# Patient Record
Sex: FEMALE | Race: WHITE | NOT HISPANIC OR LATINO | Employment: OTHER | URBAN - METROPOLITAN AREA
[De-identification: names, ages, dates, MRNs, and addresses within clinical notes are randomized per-mention and may not be internally consistent; named-entity substitution may affect disease eponyms.]

---

## 2018-10-26 ENCOUNTER — OFFICE VISIT (OUTPATIENT)
Dept: URGENT CARE | Facility: CLINIC | Age: 43
End: 2018-10-26
Payer: COMMERCIAL

## 2018-10-26 VITALS
HEIGHT: 60 IN | WEIGHT: 91 LBS | DIASTOLIC BLOOD PRESSURE: 65 MMHG | OXYGEN SATURATION: 98 % | SYSTOLIC BLOOD PRESSURE: 111 MMHG | HEART RATE: 91 BPM | TEMPERATURE: 99 F | BODY MASS INDEX: 17.87 KG/M2

## 2018-10-26 DIAGNOSIS — H10.31 ACUTE BACTERIAL CONJUNCTIVITIS OF RIGHT EYE: Primary | ICD-10-CM

## 2018-10-26 PROCEDURE — 99203 OFFICE O/P NEW LOW 30 MIN: CPT | Mod: S$GLB,,, | Performed by: PHYSICIAN ASSISTANT

## 2018-10-26 PROCEDURE — 3008F BODY MASS INDEX DOCD: CPT | Mod: CPTII,S$GLB,, | Performed by: PHYSICIAN ASSISTANT

## 2018-10-26 RX ORDER — RIZATRIPTAN BENZOATE 10 MG/1
10 TABLET ORAL
COMMUNITY
End: 2020-08-19 | Stop reason: SDUPTHER

## 2018-10-26 RX ORDER — NEOMYCIN SULFATE, POLYMYXIN B SULFATE AND DEXAMETHASONE 3.5; 10000; 1 MG/ML; [USP'U]/ML; MG/ML
2 SUSPENSION/ DROPS OPHTHALMIC EVERY 6 HOURS
Qty: 5 ML | Refills: 0 | Status: SHIPPED | OUTPATIENT
Start: 2018-10-26 | End: 2018-11-02

## 2018-10-26 NOTE — PROGRESS NOTES
"Subjective:       Patient ID: Katherine Slater is a 43 y.o. female.    Vitals:  height is 5' 0.04" (1.525 m) and weight is 41.3 kg (91 lb). Her oral temperature is 98.5 °F (36.9 °C). Her blood pressure is 111/65 and her pulse is 91. Her oxygen saturation is 98%.     Chief Complaint: Stye    xLast night right eye pain woke pt up from sleep, pt states eye was completely crusted over, pt performed warm compresses with a warm wet rag, states some relief. Pt concerned for stye. Pt states pain is radiating under right eye and is tender to touch. Pt states no left eye pain.       Eye Problem    The right eye is affected. This is a new problem. The current episode started yesterday. The problem occurs constantly. The problem has been gradually worsening. There was no injury mechanism. The pain is at a severity of 6/10. The pain is moderate. There is no known exposure to pink eye. She wears contacts. Associated symptoms include an eye discharge and eye redness. Pertinent negatives include no blurred vision, fever, nausea, photophobia or vomiting. She has tried water for the symptoms. The treatment provided mild relief.     Review of Systems   Constitution: Negative for chills and fever.   HENT: Negative for congestion.    Eyes: Positive for discharge, pain and redness. Negative for blurred vision and photophobia.   Gastrointestinal: Negative for nausea and vomiting.   Neurological: Negative for headaches.       Objective:      Physical Exam   Constitutional: She is oriented to person, place, and time. She appears well-developed and well-nourished.   HENT:   Head: Normocephalic and atraumatic.   Right Ear: External ear normal.   Left Ear: External ear normal.   Nose: Nose normal.   Mouth/Throat: Oropharynx is clear and moist.   Eyes: EOM and lids are normal. Pupils are equal, round, and reactive to light. Right eye exhibits discharge. Right conjunctiva is injected.   Swollen right lower lid   Neck: Trachea normal, full " passive range of motion without pain and phonation normal. Neck supple.   Musculoskeletal: Normal range of motion.   Neurological: She is alert and oriented to person, place, and time.   Skin: Skin is warm, dry and intact.   Psychiatric: She has a normal mood and affect. Her speech is normal and behavior is normal. Judgment and thought content normal. Cognition and memory are normal.   Nursing note and vitals reviewed.      Assessment:       1. Acute bacterial conjunctivitis of right eye        Plan:         Acute bacterial conjunctivitis of right eye    Other orders  -     neomycin-polymyxin-dexamethasone (MAXITROL) 3.5mg/mL-10,000 unit/mL-0.1 % DrpS; Place 2 drops into the right eye every 6 (six) hours. for 7 days  Dispense: 5 mL; Refill: 0     I discussed with the patient the importance of follow up if their symptoms have not resolved in 1-2 week's time. Patient verbalized understanding and will RTC or go to the nearest ER if symptoms persist or worsen.

## 2018-10-29 ENCOUNTER — TELEPHONE (OUTPATIENT)
Dept: URGENT CARE | Facility: CLINIC | Age: 43
End: 2018-10-29

## 2018-12-19 ENCOUNTER — PES CALL (OUTPATIENT)
Dept: ADMINISTRATIVE | Facility: CLINIC | Age: 43
End: 2018-12-19

## 2020-08-03 ENCOUNTER — PATIENT MESSAGE (OUTPATIENT)
Dept: FAMILY MEDICINE | Facility: CLINIC | Age: 45
End: 2020-08-03

## 2020-08-03 ENCOUNTER — OFFICE VISIT (OUTPATIENT)
Dept: FAMILY MEDICINE | Facility: CLINIC | Age: 45
End: 2020-08-03
Payer: COMMERCIAL

## 2020-08-03 VITALS — RESPIRATION RATE: 16 BRPM | WEIGHT: 90 LBS | BODY MASS INDEX: 17.67 KG/M2 | HEIGHT: 60 IN

## 2020-08-03 DIAGNOSIS — Z00.00 PREVENTATIVE HEALTH CARE: Primary | ICD-10-CM

## 2020-08-03 DIAGNOSIS — Z80.8 FAMILY HISTORY OF MELANOMA: ICD-10-CM

## 2020-08-03 DIAGNOSIS — Z80.3 FAMILY HISTORY OF BREAST CANCER: Primary | ICD-10-CM

## 2020-08-03 DIAGNOSIS — Z80.0 FAMILY HISTORY OF COLON CANCER: ICD-10-CM

## 2020-08-03 DIAGNOSIS — Z80.41 FAMILY HISTORY OF OVARIAN CANCER: ICD-10-CM

## 2020-08-03 DIAGNOSIS — Z80.42 FAMILY HISTORY OF PROSTATE CANCER IN FATHER: ICD-10-CM

## 2020-08-03 DIAGNOSIS — Z80.3 FAMILY HISTORY OF BREAST CANCER: ICD-10-CM

## 2020-08-03 PROCEDURE — 99203 OFFICE O/P NEW LOW 30 MIN: CPT | Mod: 95,,, | Performed by: INTERNAL MEDICINE

## 2020-08-03 PROCEDURE — 3008F BODY MASS INDEX DOCD: CPT | Mod: CPTII,,, | Performed by: INTERNAL MEDICINE

## 2020-08-03 PROCEDURE — 3008F PR BODY MASS INDEX (BMI) DOCUMENTED: ICD-10-PCS | Mod: CPTII,,, | Performed by: INTERNAL MEDICINE

## 2020-08-03 PROCEDURE — 99203 PR OFFICE/OUTPT VISIT, NEW, LEVL III, 30-44 MIN: ICD-10-PCS | Mod: 95,,, | Performed by: INTERNAL MEDICINE

## 2020-08-03 NOTE — TELEPHONE ENCOUNTER
Please review and advise if you would like to order this, or if there is any information we need to get about fam hx prior to ordering this.

## 2020-08-03 NOTE — PROGRESS NOTES
Assessment and Plan:    1. Preventative health care  - Comprehensive metabolic panel; Future  - CBC auto differential; Future  - Lipid Panel; Future  - TSH; Future  - Vitamin D; Future  - Hemoglobin A1C; Future  - HIV 1/2 Ag/Ab (4th Gen); Future  - Hepatitis C Antibody; Future  - Mammo Digital Screening Bilat; Future    2. Family history of breast cancer  - Ambulatory referral/consult to Breast Surgery; Future    3. Family history of melanoma  - Ambulatory referral/consult to Breast Surgery; Future    4. Family history of ovarian cancer  - Ambulatory referral/consult to Breast Surgery; Future    5. Family history of colon cancer    6. Family history of prostate cancer in father    Discussed family history in detail today. Strong family history of breast cancer and melanoma in multiple family members on both sides of family, also with individuals who had ovarian, colon, and prostate cancers. Recommend discussion with breast surgery regarding possible testing for BRCA genes or other genes associated with these cancers. Discussed that we should start with mammogram at minimum, but pending results of additional testing may recommend breast MRI.    ______________________________________________________________________  Subjective:    Chief Complaint:  Establish care    HPI:  Katherine is a 44 y.o. year old woman with virtual visit today to establish care and discuss concerns related to family history.    The patient location is: Home in [place]  The chief complaint leading to consultation is: as above    Visit type: audiovisual    Face to Face time with patient: 20 minutes  25 minutes of total time spent on the encounter, which includes face to face time and non-face to face time preparing to see the patient (eg, review of tests), Obtaining and/or reviewing separately obtained history, Documenting clinical information in the electronic or other health record, Independently interpreting results (not separately reported) and  communicating results to the patient/family/caregiver, or Care coordination (not separately reported).     Each patient to whom he or she provides medical services by telemedicine is:  (1) informed of the relationship between the physician and patient and the respective role of any other health care provider with respect to management of the patient; and (2) notified that he or she may decline to receive medical services by telemedicine and may withdraw from such care at any time.    Notes:     She personally had what she was told was an early melanoma removed from her thigh. Had large local resection, but no other treatment. She sees Dr. Segura regularly, and occasionally still sees the doctor who had treated her in [place].     Both of her father's parents had breast cancer and ovarian cancer in multiple family members including 2 family members with breast cancer in 20s and 30s. Also has multiple family members on her father's side with melanoma. Her mother's sister also had breast cancer, unknown age.    She personally has a history of very mild intermittent asthma. More exercise induced than anything else.    She had been on depo for birth control for many years and had also been on nuvaring for several years, but has been off of hormonal contraception since her  had a vasectomy years ago.       Past Medical History:  Past Medical History:   Diagnosis Date    Allergy     Angio-edema     Asthma     Melanoma in situ     Thigh, early 2000s, localized and surgically resected    Urticaria        Past Surgical History:  Past Surgical History:   Procedure Laterality Date    APPENDECTOMY      EYE SURGERY      SKIN CANCER EXCISION      left leg       Family History:  Family History   Problem Relation Age of Onset    Allergic rhinitis Mother     Allergies Mother     Allergic rhinitis Father     Allergies Father     Prostate cancer Father     Allergies Brother     Allergic rhinitis Brother      Breast cancer Paternal Grandmother     Liver cancer Paternal Grandmother     Colon cancer Paternal Aunt     Ovarian cancer Paternal Aunt     Breast cancer Other         paternal great aunt    Cervical cancer Other         paternal great grandmother (PGF mother)    Melanoma Other         paternal great uncle    Melanoma Other         cousin    Breast cancer Maternal Aunt         unknown age    Angioedema Neg Hx     Asthma Neg Hx     Eczema Neg Hx     Immunodeficiency Neg Hx     Atopy Neg Hx     Rhinitis Neg Hx     Urticaria Neg Hx        Social History:  Social History     Socioeconomic History    Marital status:      Spouse name: Not on file    Number of children: Not on file    Years of education: Not on file    Highest education level: Not on file   Occupational History    Not on file   Social Needs    Financial resource strain: Not on file    Food insecurity     Worry: Not on file     Inability: Not on file    Transportation needs     Medical: Not on file     Non-medical: Not on file   Tobacco Use    Smoking status: Never Smoker    Smokeless tobacco: Never Used   Substance and Sexual Activity    Alcohol use: No    Drug use: Never    Sexual activity: Yes     Partners: Male     Birth control/protection: Partner-Vasectomy   Lifestyle    Physical activity     Days per week: Not on file     Minutes per session: Not on file    Stress: Not on file   Relationships    Social connections     Talks on phone: Not on file     Gets together: Not on file     Attends Anabaptism service: Not on file     Active member of club or organization: Not on file     Attends meetings of clubs or organizations: Not on file     Relationship status: Not on file   Other Topics Concern    Not on file   Social History Narrative    Owner of paramedical company.       Medications:  Current Outpatient Medications on File Prior to Visit   Medication Sig Dispense Refill    ACETAMINOPHEN/PAMABROM (MIDOL ORAL)  Take 1 tablet by mouth as needed.      aspirin-acetaminophen-caffeine 250-250-65 mg (EXCEDRIN MIGRAINE) 250-250-65 mg per tablet Take 2 tablets by mouth every 6 (six) hours as needed.      epinephrine (EPIPEN 2-ARNOLDO) 0.3 mg/0.3 mL (1:1,000) PnIj Inject 0.3 mLs (0.3 mg total) into the muscle once. 2 Device 3    rizatriptan (MAXALT) 10 MG tablet Take 10 mg by mouth as needed for Migraine.       No current facility-administered medications on file prior to visit.        Allergies:  Fish oil and Shellfish containing products    Immunizations:  Immunization History   Administered Date(s) Administered    Influenza Split 11/26/2013       Review of Systems:  Review of Systems   Constitutional: Negative for activity change and unexpected weight change.   HENT: Negative for hearing loss, rhinorrhea and trouble swallowing.    Eyes: Negative for discharge and visual disturbance.   Respiratory: Negative for chest tightness and wheezing.    Cardiovascular: Negative for chest pain and palpitations.   Gastrointestinal: Negative for blood in stool, constipation, diarrhea and vomiting.   Endocrine: Negative for polydipsia and polyuria.   Genitourinary: Negative for difficulty urinating, dysuria, hematuria and menstrual problem.   Musculoskeletal: Negative for arthralgias, joint swelling and neck pain.   Skin: Negative for rash and wound.   Neurological: Negative for weakness and headaches.   Psychiatric/Behavioral: Negative for confusion and dysphoric mood.     Entered by patient and reviewed and updated during visit      Objective:    Vitals:  Vitals:    08/03/20 1008   Resp: 16   Weight: 40.8 kg (90 lb)   Height: 5' (1.524 m)       Physical Exam  Constitutional:       General: She is not in acute distress.     Appearance: She is well-developed.   HENT:      Head: Normocephalic and atraumatic.   Pulmonary:      Effort: Pulmonary effort is normal. No respiratory distress.   Neurological:      Mental Status: She is alert and  oriented to person, place, and time.   Psychiatric:         Behavior: Behavior normal.         Thought Content: Thought content normal.         Judgment: Judgment normal.         Body mass index is 17.58 kg/m².      Data:  Previous labs reviewed and pertinent for hypokalemia in 2014.        Maria Victoria Vicente MD  Internal Medicine

## 2020-08-03 NOTE — Clinical Note
Please schedule apt with Dr. Moise (breast surgery), schedule labs and mammogram, and schedule apt with me in person after labs and mammogram for annual with Pap

## 2020-08-04 ENCOUNTER — HOSPITAL ENCOUNTER (OUTPATIENT)
Dept: RADIOLOGY | Facility: HOSPITAL | Age: 45
Discharge: HOME | End: 2020-08-04
Attending: INTERNAL MEDICINE
Payer: COMMERCIAL

## 2020-08-04 DIAGNOSIS — Z12.31 BREAST CANCER SCREENING BY MAMMOGRAM: ICD-10-CM

## 2020-08-04 PROCEDURE — 77067 SCR MAMMO BI INCL CAD: CPT | Mod: TC,PO

## 2020-08-04 PROCEDURE — 77067 SCR MAMMO BI INCL CAD: CPT | Mod: 26,,, | Performed by: RADIOLOGY

## 2020-08-04 PROCEDURE — 77063 BREAST TOMOSYNTHESIS BI: CPT | Mod: 26,,, | Performed by: RADIOLOGY

## 2020-08-04 PROCEDURE — 77067 MAMMO DIGITAL SCREENING BILAT WITH TOMOSYNTHESIS_CAD: ICD-10-PCS | Mod: 26,,, | Performed by: RADIOLOGY

## 2020-08-04 PROCEDURE — 77063 MAMMO DIGITAL SCREENING BILAT WITH TOMOSYNTHESIS_CAD: ICD-10-PCS | Mod: 26,,, | Performed by: RADIOLOGY

## 2020-08-05 ENCOUNTER — PATIENT OUTREACH (OUTPATIENT)
Dept: ADMINISTRATIVE | Facility: HOSPITAL | Age: 45
End: 2020-08-05

## 2020-08-05 NOTE — PROGRESS NOTES
Health Maintenance Due   Topic Date Due    Hepatitis C Screening  1973    HIV Screening  1988    TETANUS VACCINE  1991    Lipid Panel  11/15/2018    Pap Smear with HPV Cotest  2018       Chart review completed 2020.  Care Everywhere updates requested and reviewed.  Immunizations reconciled. Media reports reviewed.  Duplicate HM overrides and  orders removed.  Overdue HM topic chart audit and/or requested.  Overdue lab testing linked to upcoming lab appointments if applies.

## 2020-08-06 ENCOUNTER — TELEPHONE (OUTPATIENT)
Dept: RADIOLOGY | Facility: HOSPITAL | Age: 45
End: 2020-08-06

## 2020-08-12 ENCOUNTER — TELEPHONE (OUTPATIENT)
Dept: FAMILY MEDICINE | Facility: CLINIC | Age: 45
End: 2020-08-12

## 2020-08-12 ENCOUNTER — CLINICAL SUPPORT (OUTPATIENT)
Dept: FAMILY MEDICINE | Facility: CLINIC | Age: 45
End: 2020-08-12
Payer: COMMERCIAL

## 2020-08-12 ENCOUNTER — HOSPITAL ENCOUNTER (OUTPATIENT)
Dept: RADIOLOGY | Facility: HOSPITAL | Age: 45
Discharge: HOME | End: 2020-08-12
Attending: INTERNAL MEDICINE
Payer: COMMERCIAL

## 2020-08-12 DIAGNOSIS — E87.6 HYPOKALEMIA: Primary | ICD-10-CM

## 2020-08-12 DIAGNOSIS — R11.2 NON-INTRACTABLE VOMITING WITH NAUSEA, UNSPECIFIED VOMITING TYPE: ICD-10-CM

## 2020-08-12 DIAGNOSIS — N60.02 BREAST CYST, LEFT: Primary | ICD-10-CM

## 2020-08-12 DIAGNOSIS — R92.8 ABNORMAL MAMMOGRAM OF LEFT BREAST: ICD-10-CM

## 2020-08-12 DIAGNOSIS — Z71.89 OTHER REASONS FOR SEEKING CONSULTATION: Primary | ICD-10-CM

## 2020-08-12 PROCEDURE — 76642 ULTRASOUND BREAST LIMITED: CPT | Mod: 26,LT,, | Performed by: RADIOLOGY

## 2020-08-12 PROCEDURE — 99499 UNLISTED E&M SERVICE: CPT | Mod: S$GLB,,, | Performed by: INTERNAL MEDICINE

## 2020-08-12 PROCEDURE — 99499 NO LOS: ICD-10-PCS | Mod: S$GLB,,, | Performed by: INTERNAL MEDICINE

## 2020-08-12 PROCEDURE — 77065 DX MAMMO INCL CAD UNI: CPT | Mod: TC,PO,LT

## 2020-08-12 PROCEDURE — 77065 DX MAMMO INCL CAD UNI: CPT | Mod: 26,LT,, | Performed by: RADIOLOGY

## 2020-08-12 PROCEDURE — 77061 BREAST TOMOSYNTHESIS UNI: CPT | Mod: 26,LT,, | Performed by: RADIOLOGY

## 2020-08-12 PROCEDURE — 76642 US BREAST LEFT LIMITED: ICD-10-PCS | Mod: 26,LT,, | Performed by: RADIOLOGY

## 2020-08-12 PROCEDURE — 77061 MAMMO DIGITAL DIAGNOSTIC LEFT WITH TOMOSYNTHESIS_CAD: ICD-10-PCS | Mod: 26,LT,, | Performed by: RADIOLOGY

## 2020-08-12 PROCEDURE — 77061 BREAST TOMOSYNTHESIS UNI: CPT | Mod: TC,PO,LT

## 2020-08-12 PROCEDURE — 77065 MAMMO DIGITAL DIAGNOSTIC LEFT WITH TOMOSYNTHESIS_CAD: ICD-10-PCS | Mod: 26,LT,, | Performed by: RADIOLOGY

## 2020-08-12 PROCEDURE — 76642 ULTRASOUND BREAST LIMITED: CPT | Mod: TC,PO,LT

## 2020-08-12 NOTE — TELEPHONE ENCOUNTER
----- Message from Leanna Paz sent at 8/12/2020  2:35 PM CDT -----  Regarding: results  Contact: patient  Type:  Patient Returning Call    Who Called:  self  Who Left Message for Patient:  Peg  Does the patient know what this is regarding?:  yes  Best Call Back Number:  [phone] (home)   Additional Information:  Patient states it is regarding results of labs. Please call patient. Thanks!

## 2020-08-12 NOTE — TELEPHONE ENCOUNTER
The lesion seen in the left breast appears to be a cyst. The radiologist recommended follow up ultrasound in 6 months. Please notify patient of results and schedule or set reminder to schedule.

## 2020-08-13 RX ORDER — ONDANSETRON 4 MG/1
4 TABLET, ORALLY DISINTEGRATING ORAL EVERY 6 HOURS PRN
Qty: 20 TABLET | Refills: 0 | Status: SHIPPED | OUTPATIENT
Start: 2020-08-13

## 2020-08-13 RX ORDER — POTASSIUM CHLORIDE 20 MEQ/1
20 TABLET, EXTENDED RELEASE ORAL 2 TIMES DAILY
Qty: 60 TABLET | Refills: 0 | Status: SHIPPED | OUTPATIENT
Start: 2020-08-13 | End: 2020-09-12

## 2020-08-13 NOTE — TELEPHONE ENCOUNTER
Spoke with patient about results. She notes that this has happened before than when she is on her menstrual cycle she gets severe nausea and vomiting, and this has led to hypokalemia in the past. We discussed that while this has happened before, it is still serious. Will start KCl BID and repeat labs on Monday. Discussed zofran for nausea. Offered to schedule EKG here, but patient prefers to run this at work and will drop off EKG, also want to make sure QT not prolonged.     Discussed that in addition to short term management, if this is difficult to keep in normal range, recommend meeting with Nephrologist. Also discussed that it may be a good idea to see Gyn as it is abnormal to be having this severe of pain and nausea with cycles and there are things that can help. She will think about this, but declines to schedule at this time due to high deductible.     Please print lab ordered, patient will come by this afternoon to pick them up and drop off EKG.

## 2020-08-13 NOTE — TELEPHONE ENCOUNTER
Maria Victoria, received a call about critical potassium of 2.3.  I spoke with her.  She says she feels perfectly normal and states she has a history of chronic hypokalemia that she feels is related to 'severe menstrual cramps'.  We have labs in the system from many years ago that confirm this is a chronic condition.  She states she does not take any potassium supplements.  She does not appear to be on any diuretics.  She does report some vomiting during her menstrual cycle and states she is now just recovering from this.  Regardless I went over any worrisome signs or symptoms and she denies anything unusual.  She was not interested in taking any potassium supplements so I encouraged her to immediately begin increasing some dietary potassium, eat a banana or two, drink a bottle of Gatorade, etc.  this evening and advised her that you certainly would want to look into this further.  I told her if she develops any lightheadedness dizziness palpitations or anything else out of the ordinary to let us know right away and consider going to the emergency room.

## 2020-08-18 ENCOUNTER — TELEPHONE (OUTPATIENT)
Dept: FAMILY MEDICINE | Facility: CLINIC | Age: 45
End: 2020-08-18

## 2020-08-18 LAB
BUN SERPL-MCNC: 14 MG/DL (ref 7–25)
BUN/CREAT SERPL: ABNORMAL (CALC) (ref 6–22)
CALCIUM SERPL-MCNC: 9.6 MG/DL (ref 8.6–10.2)
CHLORIDE SERPL-SCNC: 92 MMOL/L (ref 98–110)
CO2 SERPL-SCNC: 31 MMOL/L (ref 20–32)
CREAT SERPL-MCNC: 0.66 MG/DL (ref 0.5–1.1)
GFRSERPLBLD MDRD-ARVRAT: 106 ML/MIN/1.73M2
GLUCOSE SERPL-MCNC: 113 MG/DL (ref 65–139)
MAGNESIUM SERPL-MCNC: 2.1 MG/DL (ref 1.5–2.5)
POTASSIUM SERPL-SCNC: 3.3 MMOL/L (ref 3.5–5.3)
SODIUM SERPL-SCNC: 133 MMOL/L (ref 135–146)

## 2020-08-18 NOTE — TELEPHONE ENCOUNTER
Please contact patient and let her know that her insurance is stating that the BRCA testing will cost $1500. Would she like to proceed with this test, or would she prefer to contact her insurance first?

## 2020-08-18 NOTE — TELEPHONE ENCOUNTER
----- Message from Alexa Bolden sent at 8/18/2020  2:38 PM CDT -----  Contact: self  Type:  Patient Returning Call    Who Called:  patient  Who Left Message for Patient:  Caitlin  Does the patient know what this is regarding?:  either test results or appt tomorrow  Best Call Back Number:  [phone]  Additional Information:  Thanks

## 2020-08-18 NOTE — TELEPHONE ENCOUNTER
----- Message from Joan Tapia MD sent at 8/18/2020 10:37 AM CDT -----  Regarding: BRCA testing denial  The referring lab where we send BRCA1/BRCA2 testing has done a preauth with this patient's insurance.  They notified lab today that genetic testing is excluded from her policy so the patient will be billed directly a cost of $1500.    Do you still want to proceed with this testing?    Please let me know asap so we can maintain specimen stability.    Thanks  Joan Tapia (clinical pathology)

## 2020-08-19 ENCOUNTER — TELEPHONE (OUTPATIENT)
Dept: FAMILY MEDICINE | Facility: CLINIC | Age: 45
End: 2020-08-19

## 2020-08-19 ENCOUNTER — OFFICE VISIT (OUTPATIENT)
Dept: FAMILY MEDICINE | Facility: CLINIC | Age: 45
End: 2020-08-19
Payer: COMMERCIAL

## 2020-08-19 VITALS
DIASTOLIC BLOOD PRESSURE: 62 MMHG | TEMPERATURE: 99 F | WEIGHT: 90.25 LBS | OXYGEN SATURATION: 98 % | HEIGHT: 60 IN | BODY MASS INDEX: 17.72 KG/M2 | RESPIRATION RATE: 18 BRPM | SYSTOLIC BLOOD PRESSURE: 100 MMHG | HEART RATE: 76 BPM

## 2020-08-19 DIAGNOSIS — G43.109 MIGRAINE WITH AURA AND WITHOUT STATUS MIGRAINOSUS, NOT INTRACTABLE: ICD-10-CM

## 2020-08-19 DIAGNOSIS — E87.6 HYPOKALEMIA: ICD-10-CM

## 2020-08-19 DIAGNOSIS — Z00.00 PREVENTATIVE HEALTH CARE: Primary | ICD-10-CM

## 2020-08-19 DIAGNOSIS — Z80.8 FAMILY HISTORY OF MELANOMA: ICD-10-CM

## 2020-08-19 DIAGNOSIS — Z80.3 FAMILY HISTORY OF BREAST CANCER: ICD-10-CM

## 2020-08-19 DIAGNOSIS — Z80.42 FAMILY HISTORY OF PROSTATE CANCER IN FATHER: ICD-10-CM

## 2020-08-19 DIAGNOSIS — Z80.41 FAMILY HISTORY OF OVARIAN CANCER: ICD-10-CM

## 2020-08-19 DIAGNOSIS — Z87.892 HISTORY OF ANAPHYLAXIS: ICD-10-CM

## 2020-08-19 DIAGNOSIS — Z80.0 FAMILY HISTORY OF COLON CANCER: ICD-10-CM

## 2020-08-19 PROCEDURE — 3008F BODY MASS INDEX DOCD: CPT | Mod: CPTII,S$GLB,, | Performed by: INTERNAL MEDICINE

## 2020-08-19 PROCEDURE — 88175 CYTOPATH C/V AUTO FLUID REDO: CPT

## 2020-08-19 PROCEDURE — 87624 HPV HI-RISK TYP POOLED RSLT: CPT

## 2020-08-19 PROCEDURE — 90471 IMMUNIZATION ADMIN: CPT | Mod: S$GLB,,, | Performed by: INTERNAL MEDICINE

## 2020-08-19 PROCEDURE — 90471 TDAP VACCINE GREATER THAN OR EQUAL TO 7YO IM: ICD-10-PCS | Mod: S$GLB,,, | Performed by: INTERNAL MEDICINE

## 2020-08-19 PROCEDURE — 90715 TDAP VACCINE GREATER THAN OR EQUAL TO 7YO IM: ICD-10-PCS | Mod: S$GLB,,, | Performed by: INTERNAL MEDICINE

## 2020-08-19 PROCEDURE — 99396 PREV VISIT EST AGE 40-64: CPT | Mod: 25,S$GLB,, | Performed by: INTERNAL MEDICINE

## 2020-08-19 PROCEDURE — 90715 TDAP VACCINE 7 YRS/> IM: CPT | Mod: S$GLB,,, | Performed by: INTERNAL MEDICINE

## 2020-08-19 PROCEDURE — 99999 PR PBB SHADOW E&M-EST. PATIENT-LVL IV: ICD-10-PCS | Mod: PBBFAC,,, | Performed by: INTERNAL MEDICINE

## 2020-08-19 PROCEDURE — 3008F PR BODY MASS INDEX (BMI) DOCUMENTED: ICD-10-PCS | Mod: CPTII,S$GLB,, | Performed by: INTERNAL MEDICINE

## 2020-08-19 PROCEDURE — 99999 PR PBB SHADOW E&M-EST. PATIENT-LVL IV: CPT | Mod: PBBFAC,,, | Performed by: INTERNAL MEDICINE

## 2020-08-19 PROCEDURE — 99396 PR PREVENTIVE VISIT,EST,40-64: ICD-10-PCS | Mod: 25,S$GLB,, | Performed by: INTERNAL MEDICINE

## 2020-08-19 RX ORDER — RIZATRIPTAN BENZOATE 10 MG/1
10 TABLET ORAL
Qty: 9 TABLET | Refills: 2 | Status: SHIPPED | OUTPATIENT
Start: 2020-08-19

## 2020-08-19 RX ORDER — EPINEPHRINE 0.3 MG/.3ML
1 INJECTION SUBCUTANEOUS ONCE
Qty: 2 DEVICE | Refills: 3 | Status: SHIPPED | OUTPATIENT
Start: 2020-08-19 | End: 2020-08-19

## 2020-08-19 NOTE — TELEPHONE ENCOUNTER
----- Message from Seth Lagos sent at 8/19/2020 12:45 PM CDT -----  Type:  Pharmacy Calling to Clarify an RX    Name of Caller: Yvonne  Pharmacy Name:   [facility] [id number] - [place] - [address]  [address]  [place] [address]  Phone: [phone] Fax: [phone]      Prescription Name:   What do they need to clarify?:  rizatriptan (MAXALT) 10 MG tablet      Best Call Back Number: Directions: Max dose in 24 hours  Additional Information:  [phone]

## 2020-08-19 NOTE — TELEPHONE ENCOUNTER
Spoke with pt, she states she took orders for BRCA testing to [facility] and they would not accept it until form is signed and they also need printed med history for pt.     She will  when ready

## 2020-08-19 NOTE — TELEPHONE ENCOUNTER
----- Message from Moses Dewey sent at 8/19/2020 11:16 AM CDT -----  Contact: patient  Patient called in and stated she was just seen in office.  Patient stated she was given orders to go to [facility] for genetic testing but they need additional information along with a signature.  Patient stated she needs to speak to nurse and would appreciate a call back at [phone]

## 2020-08-19 NOTE — TELEPHONE ENCOUNTER
I can no longer see the orders once they are completed, I do not have order review option. Please print the genetic test order and I can sign it.

## 2020-08-19 NOTE — PROGRESS NOTES
Assessment and Plan:    1. Preventative health care  Discussed age appropriate preventative healthcare items including avoidance of tobacco, excessive alcohol consumption, and illicit drugs. Discussed safe sex practices. Discussed appropriate use of sunscreen, seatbelts, etc. Discussed maintenance of a healthy weight.   - Genetic Misc Sendout Test, Blood; Future  - Genetic Misc Sendout Test, Blood  - Liquid-Based Pap Smear, Screening  - HPV High Risk Genotypes, PCR  - Tdap Vaccine    2. Family history of breast cancer  - Genetic Misc Sendout Test, Blood; Future  - Genetic Misc Sendout Test, Blood  3. Family history of melanoma  - Genetic Misc Sendout Test, Blood; Future  - Genetic Misc Sendout Test, Blood  4. Family history of ovarian cancer  - Genetic Misc Sendout Test, Blood; Future  - Genetic Misc Sendout Test, Blood  5. Family history of colon cancer  - Genetic Misc Sendout Test, Blood; Future  - Genetic Misc Sendout Test, Blood  6. Family history of prostate cancer in father  - Genetic Misc Sendout Test, Blood; Future  - Genetic Misc Sendout Test, Blood  Patient states she has spoken to her insurance and genetic testing is covered only through [employer].     7. History of anaphylaxis  - EPINEPHrine (EPIPEN 2-ARNOLDO) 0.3 mg/0.3 mL AtIn; Inject 0.3 mLs (0.3 mg total) into the muscle once. for 1 dose  Dispense: 2 Device; Refill: 3    8. Migraine with aura and without status migrainosus, not intractable  - rizatriptan (MAXALT) 10 MG tablet; Take 1 tablet (10 mg total) by mouth as needed for Migraine.  Dispense: 9 tablet; Refill: 2    9. Hypokalemia  Continue KCl BID, recheck BMP in 2-3 weeks.  - Basic metabolic panel; Future  - Basic metabolic panel      ______________________________________________________________________  Subjective:    Chief Complaint:  Annual exam with Pap smear    HPI:  Katherine is a 44 y.o. year old female here for annual with Pap smear.     Since last apt, labs were notable for severe  hypokalemia. She had reported having a lot of vomiting which she attributed to heavy/crampy period before this lab was drawn. Reports today that she has had heavy periods associated with a lot of pain and vomiting for years. States she has been seen by multiple gynecologists for this that blew off her symptoms.     After lab showed hypokalemia, she had started KCl 20 mg BID (though has been forgetting second dose) and K improved to 3.3. Mag 2.1.     Medications:  Current Outpatient Medications on File Prior to Visit   Medication Sig Dispense Refill    ACETAMINOPHEN/PAMABROM (MIDOL ORAL) Take 1 tablet by mouth as needed.      aspirin-acetaminophen-caffeine 250-250-65 mg (EXCEDRIN MIGRAINE) 250-250-65 mg per tablet Take 2 tablets by mouth every 6 (six) hours as needed.      ondansetron (ZOFRAN-ODT) 4 MG TbDL Take 1 tablet (4 mg total) by mouth every 6 (six) hours as needed (Nausea). 20 tablet 0    potassium chloride SA (K-DUR,KLOR-CON) 20 MEQ tablet Take 1 tablet (20 mEq total) by mouth 2 (two) times daily. 60 tablet 0    [DISCONTINUED] epinephrine (EPIPEN 2-ARNOLDO) 0.3 mg/0.3 mL (1:1,000) PnIj Inject 0.3 mLs (0.3 mg total) into the muscle once. 2 Device 3    [DISCONTINUED] rizatriptan (MAXALT) 10 MG tablet Take 10 mg by mouth as needed for Migraine.       No current facility-administered medications on file prior to visit.        Review of Systems:  Review of Systems   Constitutional: Negative for chills and fever.   Respiratory: Negative for shortness of breath and wheezing.    Cardiovascular: Negative for chest pain and leg swelling.   Gastrointestinal: Negative for constipation, diarrhea, nausea and vomiting.   Neurological: Negative for seizures and syncope.       Past Medical History:  Past Medical History:   Diagnosis Date    Allergy     Angio-edema     Asthma     Melanoma in situ     Thigh, early 2000s, localized and surgically resected    Urticaria        Objective:    Vitals:  Vitals:    08/19/20  0938   BP: 100/62   Pulse: 76   Resp: 18   Temp: 99 °F (37.2 °C)   TempSrc: Temporal   SpO2: 98%   Weight: 40.9 kg (90 lb 4.5 oz)   Height: 5' (1.524 m)   PainSc: 0-No pain       Physical Exam  Vitals signs reviewed.   Constitutional:       General: She is not in acute distress.     Appearance: She is well-developed.   Eyes:      General: No scleral icterus.        Right eye: No discharge.         Left eye: No discharge.      Conjunctiva/sclera: Conjunctivae normal.   Cardiovascular:      Rate and Rhythm: Normal rate and regular rhythm.      Heart sounds: No murmur.   Pulmonary:      Effort: Pulmonary effort is normal. No respiratory distress.      Breath sounds: Normal breath sounds. No wheezing.   Genitourinary:     Labia:         Right: No rash or lesion.         Left: No rash or lesion.       Vagina: Normal. No erythema.      Cervix: No cervical motion tenderness, discharge or friability.      Adnexa:         Right: No mass, tenderness or fullness.          Left: No mass, tenderness or fullness.     Skin:     General: Skin is warm and dry.   Neurological:      Mental Status: She is alert and oriented to person, place, and time.   Psychiatric:         Behavior: Behavior normal.         Thought Content: Thought content normal.         Judgment: Judgment normal.         Data:  Previous labs reviewed and pertinent for K up to 3.3.      Maria Victoria Vicente MD  Internal Medicine

## 2020-08-20 NOTE — TELEPHONE ENCOUNTER
----- Message from Helder Davidson sent at 8/20/2020  1:11 PM CDT -----  Regarding: Yvonne Chong  [phone]  Type:  RX Refill Request    Who Called:  Yvonne Sheffieldsissy  [phone]    Refill or New Rx:  Refill    RX Name and Strength:  rizatriptan (MAXALT) 10 MG tablet 9 tablet 2 8/19/2020  No  Sig - Route: Take 1 tablet (10 mg total) by mouth as needed for Migraine. - Oral  Sent to pharmacy as: rizatriptan (MAXALT) 10 MG tablet  Class: Normal      How is the patient currently taking it? (ex. 1XDay):  See above.    Is this a 30 day or 90 day RX:  See above      Preferred Pharmacy with phone number:      [facility] [address] [place], [place] - [address]  [address]  [facility] [address]  Phone: [phone] Fax: [phone]      Local or Mail Order: Local    Ordering Provider:  Dr Vicente    Best Call Back Number:  Yvonne Sheffieldsissy  [phone]    Additional Information:  Advised needs to clarify directions for maximum daily dosage. Please call.

## 2020-08-27 LAB
HPV HR 12 DNA SPEC QL NAA+PROBE: NEGATIVE
HPV16 AG SPEC QL: NEGATIVE
HPV18 DNA SPEC QL NAA+PROBE: NEGATIVE

## 2020-09-03 LAB
FINAL PATHOLOGIC DIAGNOSIS: NORMAL
Lab: NORMAL

## 2020-10-28 ENCOUNTER — TELEPHONE (OUTPATIENT)
Dept: FAMILY MEDICINE | Facility: CLINIC | Age: 45
End: 2020-10-28

## 2020-10-28 NOTE — TELEPHONE ENCOUNTER
Please call patient about the BRCA test, the results are all normal. There were no variants of the BRCA genes detected. Please let me know if she has any questions or concerns about these results.

## 2022-02-28 ENCOUNTER — PATIENT MESSAGE (OUTPATIENT)
Dept: ADMINISTRATIVE | Facility: HOSPITAL | Age: 47
End: 2022-02-28
Payer: COMMERCIAL

## 2022-05-31 ENCOUNTER — PATIENT MESSAGE (OUTPATIENT)
Dept: ADMINISTRATIVE | Facility: HOSPITAL | Age: 47
End: 2022-05-31
Payer: COMMERCIAL